# Patient Record
Sex: FEMALE | Race: WHITE | NOT HISPANIC OR LATINO | URBAN - METROPOLITAN AREA
[De-identification: names, ages, dates, MRNs, and addresses within clinical notes are randomized per-mention and may not be internally consistent; named-entity substitution may affect disease eponyms.]

---

## 2017-01-06 ENCOUNTER — EMERGENCY (EMERGENCY)
Facility: HOSPITAL | Age: 22
LOS: 1 days | Discharge: HOME | End: 2017-01-06
Attending: EMERGENCY MEDICINE
Payer: SELF-PAY

## 2017-01-06 VITALS
RESPIRATION RATE: 18 BRPM | SYSTOLIC BLOOD PRESSURE: 141 MMHG | DIASTOLIC BLOOD PRESSURE: 87 MMHG | TEMPERATURE: 99 F | OXYGEN SATURATION: 97 % | HEIGHT: 67 IN | WEIGHT: 119.93 LBS | HEART RATE: 92 BPM
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
## 2017-01-06 DIAGNOSIS — S30.0XXA CONTUSION OF LOWER BACK AND PELVIS, INITIAL ENCOUNTER: ICD-10-CM

## 2017-01-06 DIAGNOSIS — F90.9 ATTENTION-DEFICIT HYPERACTIVITY DISORDER, UNSPECIFIED TYPE: ICD-10-CM
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
## 2017-01-06 LAB — HCG UR QL: NEGATIVE — SIGNIFICANT CHANGE UP

## 2017-01-06 PROCEDURE — 72100 X-RAY EXAM L-S SPINE 2/3 VWS: CPT | Mod: 26

## 2017-01-06 PROCEDURE — 99284 EMERGENCY DEPT VISIT MOD MDM: CPT

## 2017-01-06 PROCEDURE — 81025 URINE PREGNANCY TEST: CPT

## 2017-01-06 PROCEDURE — 72100 X-RAY EXAM L-S SPINE 2/3 VWS: CPT

## 2017-01-06 PROCEDURE — 99283 EMERGENCY DEPT VISIT LOW MDM: CPT | Mod: 25

## 2017-01-06 RX ORDER — METHOCARBAMOL 500 MG/1
1500 TABLET, FILM COATED ORAL ONCE
Qty: 0 | Refills: 0 | Status: COMPLETED | OUTPATIENT
Start: 2017-01-06 | End: 2017-01-06

## 2017-01-06 RX ORDER — IBUPROFEN 200 MG
600 TABLET ORAL ONCE
Qty: 0 | Refills: 0 | Status: COMPLETED | OUTPATIENT
Start: 2017-01-06 | End: 2017-01-06

## 2017-01-06 RX ORDER — IBUPROFEN 200 MG
1 TABLET ORAL
Qty: 20 | Refills: 0 | OUTPATIENT
Start: 2017-01-06

## 2017-01-06 RX ORDER — METHOCARBAMOL 500 MG/1
2 TABLET, FILM COATED ORAL
Qty: 40 | Refills: 0 | OUTPATIENT
Start: 2017-01-06 | End: 2017-01-11

## 2017-01-06 RX ADMIN — METHOCARBAMOL 1500 MILLIGRAM(S): 500 TABLET, FILM COATED ORAL at 11:39

## 2017-01-06 RX ADMIN — Medication 600 MILLIGRAM(S): at 11:39

## 2017-01-06 RX ADMIN — Medication 600 MILLIGRAM(S): at 12:09

## 2017-01-06 NOTE — ED PROVIDER NOTE - PHYSICAL EXAMINATION
MSK: Back: B/L lumbar paraspinal tenderness and tenseness. No cervical, thoracic or lumbosacral midline bony deformities,  +rotation and flexion-extension of neck and truncal area intact.

## 2017-01-06 NOTE — ED PROVIDER NOTE - MEDICAL DECISION MAKING DETAILS
Pt able to ambulate, no distress.  Rx IBU and Robaxin, f/u with PMD via care coordinator. Pt is well appearing walking with normal gait, stable for discharge and follow up with medical doctor. Pt educated on care and need for follow up. Discussed anticipatory guidance and return precautions. Questions answered. I had a detailed discussion with the patient and/or guardian regarding the historical points, exam findings, and any diagnostic results supporting the discharge diagnosis.
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
## 2017-01-06 NOTE — ED PROVIDER NOTE - NS ED MD SCRIBE ATTENDING SCRIBE SECTIONS
PAST MEDICAL/SURGICAL/SOCIAL HISTORY/HIV/HISTORY OF PRESENT ILLNESS/VITAL SIGNS( Pullset)/PHYSICAL EXAM/REVIEW OF SYSTEMS

## 2018-02-01 ENCOUNTER — OUTPATIENT (OUTPATIENT)
Dept: OUTPATIENT SERVICES | Facility: HOSPITAL | Age: 23
LOS: 1 days | End: 2018-02-01
Payer: MEDICAID

## 2018-02-01 PROCEDURE — G9001: CPT

## 2018-02-14 ENCOUNTER — EMERGENCY (EMERGENCY)
Facility: HOSPITAL | Age: 23
LOS: 1 days | Discharge: HOME | End: 2018-02-14
Attending: EMERGENCY MEDICINE
Payer: MEDICAID

## 2018-02-14 VITALS
TEMPERATURE: 99 F | DIASTOLIC BLOOD PRESSURE: 84 MMHG | HEIGHT: 67 IN | RESPIRATION RATE: 18 BRPM | HEART RATE: 88 BPM | SYSTOLIC BLOOD PRESSURE: 128 MMHG | WEIGHT: 136.91 LBS | OXYGEN SATURATION: 99 %

## 2018-02-14 VITALS
DIASTOLIC BLOOD PRESSURE: 78 MMHG | HEART RATE: 94 BPM | RESPIRATION RATE: 18 BRPM | TEMPERATURE: 100 F | SYSTOLIC BLOOD PRESSURE: 117 MMHG | OXYGEN SATURATION: 100 %

## 2018-02-14 PROBLEM — F90.9 ATTENTION-DEFICIT HYPERACTIVITY DISORDER, UNSPECIFIED TYPE: Chronic | Status: ACTIVE | Noted: 2017-01-06

## 2018-02-14 PROCEDURE — 99284 EMERGENCY DEPT VISIT MOD MDM: CPT | Mod: 25

## 2018-02-14 PROCEDURE — 73630 X-RAY EXAM OF FOOT: CPT

## 2018-02-14 PROCEDURE — 73630 X-RAY EXAM OF FOOT: CPT | Mod: 26,RT

## 2018-02-14 PROCEDURE — 73610 X-RAY EXAM OF ANKLE: CPT

## 2018-02-14 PROCEDURE — 73590 X-RAY EXAM OF LOWER LEG: CPT | Mod: 26,RT

## 2018-02-14 PROCEDURE — 29515 APPLICATION SHORT LEG SPLINT: CPT | Mod: RT

## 2018-02-14 PROCEDURE — 73610 X-RAY EXAM OF ANKLE: CPT | Mod: 26,RT

## 2018-02-14 PROCEDURE — 73590 X-RAY EXAM OF LOWER LEG: CPT

## 2018-02-14 PROCEDURE — 29515 APPLICATION SHORT LEG SPLINT: CPT

## 2018-02-14 RX ORDER — ACETAMINOPHEN 500 MG
650 TABLET ORAL ONCE
Qty: 0 | Refills: 0 | Status: COMPLETED | OUTPATIENT
Start: 2018-02-14 | End: 2018-02-14

## 2018-02-14 RX ORDER — IBUPROFEN 200 MG
1 TABLET ORAL
Qty: 15 | Refills: 0 | OUTPATIENT
Start: 2018-02-14

## 2018-02-14 RX ADMIN — Medication 650 MILLIGRAM(S): at 16:49

## 2018-02-14 NOTE — ED ADULT NURSE NOTE - CHPI ED SYMPTOMS NEG
no decreased eating/drinking/no dizziness/no vomiting/no tingling/no numbness/no fever/no nausea/no chills/no weakness
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
## 2018-02-15 DIAGNOSIS — R69 ILLNESS, UNSPECIFIED: ICD-10-CM

## 2019-03-19 ENCOUNTER — EMERGENCY (EMERGENCY)
Facility: HOSPITAL | Age: 24
LOS: 1 days | Discharge: HOME | End: 2019-03-19
Attending: EMERGENCY MEDICINE
Payer: MEDICAID

## 2019-03-19 VITALS
TEMPERATURE: 98 F | RESPIRATION RATE: 17 BRPM | DIASTOLIC BLOOD PRESSURE: 75 MMHG | SYSTOLIC BLOOD PRESSURE: 109 MMHG | OXYGEN SATURATION: 98 % | HEART RATE: 87 BPM | WEIGHT: 126.1 LBS

## 2019-03-19 PROCEDURE — 99281 EMR DPT VST MAYX REQ PHY/QHP: CPT

## 2019-03-19 NOTE — ED PROVIDER NOTE - CLINICAL SUMMARY MEDICAL DECISION MAKING FREE TEXT BOX
Pt well appearing. Does not appear acutely anxious. Hemodynamically stable and psychiatrically stable. Called PMD office, who will attempt to contact psychiatrist for medication refill. Office staff states they believed it was already done on the pt's behalf. Pt to be discharged and advised to check again with her pharmacy for med refill. Pt well appearing. Does not appear acutely anxious. Hemodynamically stable and psychiatrically stable. Called Dr. Ramirez's office,  states was sent yesterday and will attempt to contact psychiatrist again today for medication refill. Office staff states they believed it was already done on the pt's behalf. Pt to be discharged and advised to check again with her pharmacy for med refill.

## 2019-03-19 NOTE — ED PROVIDER NOTE - OBJECTIVE STATEMENT
Pt is a 24 y/o F, with PMHx of ADHD, presenting to the ED with medication refill. Pt states she ran out of her vyvanse 40mg a few days ago. She called her PMD and was told a refill was sent to her regular pharmacy and would be ready today. Pt went to the pharmacy but it was not there and pt was told by the staff at [facility] to come to the ED to obtain her refill. Was told that her PMD was out of the office and could not resend it. Denies any sxs. Pt states she is feeling fine otherwise. Pt is a 24 y/o F, with PMHx of ADHD, presenting to the ED with medication refill. Pt states she ran out of her vyvanse 40mg a few days ago. She called her psychiatrist and was told a refill was sent to her regular pharmacy and would be ready today. Pt went to the pharmacy but it was not there and pt was told by the staff at [facility] to come to the ED to obtain her refill. Was told that her psychiatrist was out of the office and could not resend it. Denies any sxs. Pt states she is feeling fine otherwise.

## 2019-03-19 NOTE — ED PROVIDER NOTE - PHYSICAL EXAMINATION
Afebrile, hemodynamically stable, saturating well  NAD, well appearing  Head NCAT  EOMI grossly, anicteric  MMM  Breathing comfortably in RA  AAO, CN's 3-12 grossly intact  JEFFERSON spontaneously

## 2023-07-28 NOTE — ED ADULT NURSE NOTE - NS ED PATIENT SAFETY CONCERN
Medication and Quantity requested: diclofenac (VOLTAREN) 75 MG EC tablet        Last Visit  5/4/23      Pharmacy and phone number updated in [facility]:  yes
No

## 2024-06-04 NOTE — ED PROVIDER NOTE - NEUROLOGICAL, MLM
(1) body pink, extremities blue
Patient has hyponatremia which is uncontrolled,We will aim to correct the sodium by 4-6mEq in 24 hours. We will monitor sodium Daily. The hyponatremia is due to ESRD. Discussed with nephrology, dialysate bath adjusted to account for and correct hyponatremia.  Recent Labs   Lab 06/04/24  0714   *     IMPROVING  - Daily morning CMP  - Continue to montior  
Alert and oriented, no focal deficits, no motor or sensory deficits.